# Patient Record
Sex: FEMALE | Race: WHITE | NOT HISPANIC OR LATINO | Employment: UNEMPLOYED | URBAN - METROPOLITAN AREA
[De-identification: names, ages, dates, MRNs, and addresses within clinical notes are randomized per-mention and may not be internally consistent; named-entity substitution may affect disease eponyms.]

---

## 2024-11-22 ENCOUNTER — APPOINTMENT (OUTPATIENT)
Dept: RADIOLOGY | Facility: HOSPITAL | Age: 44
End: 2024-11-22
Payer: MEDICAID

## 2024-11-22 ENCOUNTER — HOSPITAL ENCOUNTER (EMERGENCY)
Facility: HOSPITAL | Age: 44
Discharge: OTHER INSTITUTION | End: 2024-11-23
Attending: STUDENT IN AN ORGANIZED HEALTH CARE EDUCATION/TRAINING PROGRAM
Payer: MEDICAID

## 2024-11-22 ENCOUNTER — APPOINTMENT (OUTPATIENT)
Dept: CARDIOLOGY | Facility: HOSPITAL | Age: 44
End: 2024-11-22
Payer: MEDICAID

## 2024-11-22 DIAGNOSIS — J18.9 PNEUMONIA OF RIGHT LOWER LOBE DUE TO INFECTIOUS ORGANISM: ICD-10-CM

## 2024-11-22 DIAGNOSIS — E83.51 HYPOCALCEMIA: ICD-10-CM

## 2024-11-22 DIAGNOSIS — N17.9 AKI (ACUTE KIDNEY INJURY) (CMS-HCC): Primary | ICD-10-CM

## 2024-11-22 DIAGNOSIS — N19 UREMIA: ICD-10-CM

## 2024-11-22 LAB
ALBUMIN SERPL BCP-MCNC: 2.3 G/DL (ref 3.4–5)
ALBUMIN SERPL BCP-MCNC: 3.3 G/DL (ref 3.4–5)
ALP SERPL-CCNC: 140 U/L (ref 33–110)
ALP SERPL-CCNC: 94 U/L (ref 33–110)
ALT SERPL W P-5'-P-CCNC: 17 U/L (ref 7–45)
ALT SERPL W P-5'-P-CCNC: 24 U/L (ref 7–45)
ANION GAP BLDV CALCULATED.4IONS-SCNC: 25 MMOL/L (ref 10–25)
ANION GAP SERPL CALC-SCNC: 21 MMOL/L (ref 10–20)
ANION GAP SERPL CALC-SCNC: 29 MMOL/L (ref 10–20)
AST SERPL W P-5'-P-CCNC: 42 U/L (ref 9–39)
AST SERPL W P-5'-P-CCNC: 50 U/L (ref 9–39)
BASE EXCESS BLDV CALC-SCNC: -16.3 MMOL/L (ref -2–3)
BASOPHILS # BLD MANUAL: 0 X10*3/UL (ref 0–0.1)
BASOPHILS NFR BLD MANUAL: 0 %
BILIRUB SERPL-MCNC: 1 MG/DL (ref 0–1.2)
BILIRUB SERPL-MCNC: 1.4 MG/DL (ref 0–1.2)
BODY TEMPERATURE: 37 DEGREES CELSIUS
BUN SERPL-MCNC: 86 MG/DL (ref 6–23)
BUN SERPL-MCNC: 91 MG/DL (ref 6–23)
BURR CELLS BLD QL SMEAR: ABNORMAL
CA-I BLD-SCNC: 0.67 MMOL/L (ref 1.1–1.33)
CA-I BLDV-SCNC: 0.87 MMOL/L (ref 1.1–1.33)
CALCIUM SERPL-MCNC: 5.2 MG/DL (ref 8.6–10.3)
CALCIUM SERPL-MCNC: 6.8 MG/DL (ref 8.6–10.3)
CARDIAC TROPONIN I PNL SERPL HS: 6 NG/L (ref 0–13)
CARDIAC TROPONIN I PNL SERPL HS: 7 NG/L (ref 0–13)
CHLORIDE BLDV-SCNC: 88 MMOL/L (ref 98–107)
CHLORIDE SERPL-SCNC: 88 MMOL/L (ref 98–107)
CHLORIDE SERPL-SCNC: 94 MMOL/L (ref 98–107)
CK SERPL-CCNC: 214 U/L (ref 0–215)
CO2 SERPL-SCNC: 12 MMOL/L (ref 21–32)
CO2 SERPL-SCNC: 14 MMOL/L (ref 21–32)
CREAT SERPL-MCNC: 6.01 MG/DL (ref 0.5–1.05)
CREAT SERPL-MCNC: 6.84 MG/DL (ref 0.5–1.05)
DOHLE BOD BLD QL SMEAR: PRESENT
EGFRCR SERPLBLD CKD-EPI 2021: 7 ML/MIN/1.73M*2
EGFRCR SERPLBLD CKD-EPI 2021: 8 ML/MIN/1.73M*2
EOSINOPHIL # BLD MANUAL: 0 X10*3/UL (ref 0–0.7)
EOSINOPHIL NFR BLD MANUAL: 0 %
ERYTHROCYTE [DISTWIDTH] IN BLOOD BY AUTOMATED COUNT: 13.4 % (ref 11.5–14.5)
FLUAV RNA RESP QL NAA+PROBE: NOT DETECTED
FLUBV RNA RESP QL NAA+PROBE: NOT DETECTED
GIANT PLATELETS BLD QL SMEAR: ABNORMAL
GLUCOSE BLD MANUAL STRIP-MCNC: 123 MG/DL (ref 74–99)
GLUCOSE BLD MANUAL STRIP-MCNC: 152 MG/DL (ref 74–99)
GLUCOSE BLD MANUAL STRIP-MCNC: 59 MG/DL (ref 74–99)
GLUCOSE BLDV-MCNC: 64 MG/DL (ref 74–99)
GLUCOSE SERPL-MCNC: 138 MG/DL (ref 74–99)
GLUCOSE SERPL-MCNC: 61 MG/DL (ref 74–99)
HCO3 BLDV-SCNC: 12 MMOL/L (ref 22–26)
HCT VFR BLD AUTO: 40.5 % (ref 36–46)
HCT VFR BLD EST: 43 % (ref 36–46)
HGB BLD-MCNC: 14.5 G/DL (ref 12–16)
HGB BLDV-MCNC: 14.3 G/DL (ref 12–16)
IMM GRANULOCYTES # BLD AUTO: 0.69 X10*3/UL (ref 0–0.7)
IMM GRANULOCYTES NFR BLD AUTO: 5.1 % (ref 0–0.9)
INHALED O2 CONCENTRATION: 21 %
LACTATE BLDV-SCNC: 2.1 MMOL/L (ref 0.4–2)
LACTATE BLDV-SCNC: 2.1 MMOL/L (ref 0.4–2)
LACTATE BLDV-SCNC: 2.7 MMOL/L (ref 0.4–2)
LACTATE SERPL-SCNC: 1.7 MMOL/L (ref 0.4–2)
LIPASE SERPL-CCNC: 19 U/L (ref 9–82)
LYMPHOCYTES # BLD MANUAL: 0 X10*3/UL (ref 1.2–4.8)
LYMPHOCYTES NFR BLD MANUAL: 0 %
MCH RBC QN AUTO: 32.4 PG (ref 26–34)
MCHC RBC AUTO-ENTMCNC: 35.8 G/DL (ref 32–36)
MCV RBC AUTO: 91 FL (ref 80–100)
MONOCYTES # BLD MANUAL: 0.68 X10*3/UL (ref 0.1–1)
MONOCYTES NFR BLD MANUAL: 5 %
NEUTROPHILS # BLD MANUAL: 12.92 X10*3/UL (ref 1.2–7.7)
NEUTS BAND # BLD MANUAL: 0.82 X10*3/UL (ref 0–0.7)
NEUTS BAND NFR BLD MANUAL: 6 %
NEUTS SEG # BLD MANUAL: 12.1 X10*3/UL (ref 1.2–7)
NEUTS SEG NFR BLD MANUAL: 89 %
NEUTS VAC BLD QL SMEAR: PRESENT
NRBC BLD-RTO: 0 /100 WBCS (ref 0–0)
OXYHGB MFR BLDV: 40.9 % (ref 45–75)
PCO2 BLDV: 36 MM HG (ref 41–51)
PH BLDV: 7.13 PH (ref 7.33–7.43)
PLATELET # BLD AUTO: 92 X10*3/UL (ref 150–450)
PLATELET CLUMP BLD QL SMEAR: PRESENT
PO2 BLDV: 33 MM HG (ref 35–45)
POTASSIUM BLDV-SCNC: 3.5 MMOL/L (ref 3.5–5.3)
POTASSIUM SERPL-SCNC: 3.4 MMOL/L (ref 3.5–5.3)
POTASSIUM SERPL-SCNC: 3.5 MMOL/L (ref 3.5–5.3)
PROT SERPL-MCNC: 4.7 G/DL (ref 6.4–8.2)
PROT SERPL-MCNC: 7 G/DL (ref 6.4–8.2)
RBC # BLD AUTO: 4.47 X10*6/UL (ref 4–5.2)
RBC MORPH BLD: ABNORMAL
RSV RNA RESP QL NAA+PROBE: NOT DETECTED
SAO2 % BLDV: 41 % (ref 45–75)
SARS-COV-2 RNA RESP QL NAA+PROBE: NOT DETECTED
SODIUM BLDV-SCNC: 121 MMOL/L (ref 136–145)
SODIUM SERPL-SCNC: 125 MMOL/L (ref 136–145)
SODIUM SERPL-SCNC: 126 MMOL/L (ref 136–145)
TOTAL CELLS COUNTED BLD: 100
TOXIC GRANULES BLD QL SMEAR: PRESENT
WBC # BLD AUTO: 13.6 X10*3/UL (ref 4.4–11.3)

## 2024-11-22 PROCEDURE — 96372 THER/PROPH/DIAG INJ SC/IM: CPT | Mod: 59 | Performed by: NURSE PRACTITIONER

## 2024-11-22 PROCEDURE — 84132 ASSAY OF SERUM POTASSIUM: CPT | Performed by: NURSE PRACTITIONER

## 2024-11-22 PROCEDURE — 36415 COLL VENOUS BLD VENIPUNCTURE: CPT | Performed by: NURSE PRACTITIONER

## 2024-11-22 PROCEDURE — 83605 ASSAY OF LACTIC ACID: CPT | Performed by: NURSE PRACTITIONER

## 2024-11-22 PROCEDURE — 82330 ASSAY OF CALCIUM: CPT

## 2024-11-22 PROCEDURE — 82550 ASSAY OF CK (CPK): CPT | Performed by: NURSE PRACTITIONER

## 2024-11-22 PROCEDURE — 2500000004 HC RX 250 GENERAL PHARMACY W/ HCPCS (ALT 636 FOR OP/ED): Performed by: NURSE PRACTITIONER

## 2024-11-22 PROCEDURE — 71045 X-RAY EXAM CHEST 1 VIEW: CPT

## 2024-11-22 PROCEDURE — 85007 BL SMEAR W/DIFF WBC COUNT: CPT | Performed by: NURSE PRACTITIONER

## 2024-11-22 PROCEDURE — 96375 TX/PRO/DX INJ NEW DRUG ADDON: CPT

## 2024-11-22 PROCEDURE — 96365 THER/PROPH/DIAG IV INF INIT: CPT

## 2024-11-22 PROCEDURE — 96366 THER/PROPH/DIAG IV INF ADDON: CPT

## 2024-11-22 PROCEDURE — 2500000005 HC RX 250 GENERAL PHARMACY W/O HCPCS: Performed by: NURSE PRACTITIONER

## 2024-11-22 PROCEDURE — 99291 CRITICAL CARE FIRST HOUR: CPT | Performed by: NURSE PRACTITIONER

## 2024-11-22 PROCEDURE — 71250 CT THORAX DX C-: CPT | Performed by: STUDENT IN AN ORGANIZED HEALTH CARE EDUCATION/TRAINING PROGRAM

## 2024-11-22 PROCEDURE — 2500000004 HC RX 250 GENERAL PHARMACY W/ HCPCS (ALT 636 FOR OP/ED)

## 2024-11-22 PROCEDURE — 96367 TX/PROPH/DG ADDL SEQ IV INF: CPT

## 2024-11-22 PROCEDURE — 87637 SARSCOV2&INF A&B&RSV AMP PRB: CPT | Performed by: NURSE PRACTITIONER

## 2024-11-22 PROCEDURE — 85027 COMPLETE CBC AUTOMATED: CPT | Performed by: NURSE PRACTITIONER

## 2024-11-22 PROCEDURE — 83690 ASSAY OF LIPASE: CPT | Performed by: NURSE PRACTITIONER

## 2024-11-22 PROCEDURE — 2500000004 HC RX 250 GENERAL PHARMACY W/ HCPCS (ALT 636 FOR OP/ED): Mod: JZ

## 2024-11-22 PROCEDURE — 82947 ASSAY GLUCOSE BLOOD QUANT: CPT

## 2024-11-22 PROCEDURE — 96361 HYDRATE IV INFUSION ADD-ON: CPT

## 2024-11-22 PROCEDURE — 71250 CT THORAX DX C-: CPT

## 2024-11-22 PROCEDURE — 93005 ELECTROCARDIOGRAM TRACING: CPT

## 2024-11-22 PROCEDURE — 84484 ASSAY OF TROPONIN QUANT: CPT | Performed by: NURSE PRACTITIONER

## 2024-11-22 PROCEDURE — 99285 EMERGENCY DEPT VISIT HI MDM: CPT | Mod: 25

## 2024-11-22 PROCEDURE — 71045 X-RAY EXAM CHEST 1 VIEW: CPT | Performed by: RADIOLOGY

## 2024-11-22 PROCEDURE — 74176 CT ABD & PELVIS W/O CONTRAST: CPT | Performed by: STUDENT IN AN ORGANIZED HEALTH CARE EDUCATION/TRAINING PROGRAM

## 2024-11-22 PROCEDURE — 2500000005 HC RX 250 GENERAL PHARMACY W/O HCPCS: Performed by: STUDENT IN AN ORGANIZED HEALTH CARE EDUCATION/TRAINING PROGRAM

## 2024-11-22 PROCEDURE — 51798 US URINE CAPACITY MEASURE: CPT

## 2024-11-22 PROCEDURE — 87040 BLOOD CULTURE FOR BACTERIA: CPT | Mod: PORLAB | Performed by: NURSE PRACTITIONER

## 2024-11-22 RX ORDER — SODIUM BICARBONATE 1 MEQ/ML
50 SYRINGE (ML) INTRAVENOUS ONCE
Status: COMPLETED | OUTPATIENT
Start: 2024-11-22 | End: 2024-11-22

## 2024-11-22 RX ORDER — CALCIUM GLUCONATE 20 MG/ML
1 INJECTION, SOLUTION INTRAVENOUS ONCE
Status: DISCONTINUED | OUTPATIENT
Start: 2024-11-22 | End: 2024-11-22

## 2024-11-22 RX ORDER — CALCIUM GLUCONATE 20 MG/ML
2 INJECTION, SOLUTION INTRAVENOUS ONCE
Status: COMPLETED | OUTPATIENT
Start: 2024-11-22 | End: 2024-11-23

## 2024-11-22 RX ORDER — DICYCLOMINE HYDROCHLORIDE 10 MG/ML
20 INJECTION INTRAMUSCULAR ONCE
Status: COMPLETED | OUTPATIENT
Start: 2024-11-22 | End: 2024-11-22

## 2024-11-22 RX ORDER — DEXTROSE 50 % IN WATER (D50W) INTRAVENOUS SYRINGE
25 ONCE
Status: COMPLETED | OUTPATIENT
Start: 2024-11-22 | End: 2024-11-22

## 2024-11-22 RX ORDER — METOCLOPRAMIDE HYDROCHLORIDE 5 MG/ML
10 INJECTION INTRAMUSCULAR; INTRAVENOUS ONCE
Status: COMPLETED | OUTPATIENT
Start: 2024-11-22 | End: 2024-11-22

## 2024-11-22 RX ORDER — NOREPINEPHRINE BITARTRATE/D5W 8 MG/250ML
0-.2 PLASTIC BAG, INJECTION (ML) INTRAVENOUS CONTINUOUS
Status: DISCONTINUED | OUTPATIENT
Start: 2024-11-22 | End: 2024-11-23 | Stop reason: HOSPADM

## 2024-11-22 RX ORDER — CEFTRIAXONE 2 G/50ML
2 INJECTION, SOLUTION INTRAVENOUS ONCE
Status: COMPLETED | OUTPATIENT
Start: 2024-11-22 | End: 2024-11-22

## 2024-11-22 RX ORDER — ONDANSETRON HYDROCHLORIDE 2 MG/ML
INJECTION, SOLUTION INTRAVENOUS
Status: COMPLETED
Start: 2024-11-22 | End: 2024-11-22

## 2024-11-22 RX ADMIN — METOCLOPRAMIDE 10 MG: 5 INJECTION, SOLUTION INTRAMUSCULAR; INTRAVENOUS at 18:28

## 2024-11-22 RX ADMIN — SODIUM CHLORIDE 1000 ML: 9 INJECTION, SOLUTION INTRAVENOUS at 21:40

## 2024-11-22 RX ADMIN — DICYCLOMINE HYDROCHLORIDE 20 MG: 10 INJECTION, SOLUTION INTRAMUSCULAR at 18:28

## 2024-11-22 RX ADMIN — CALCIUM GLUCONATE 2 G: 20 INJECTION, SOLUTION INTRAVENOUS at 23:35

## 2024-11-22 RX ADMIN — DEXTROSE MONOHYDRATE 25 G: 25 INJECTION, SOLUTION INTRAVENOUS at 15:16

## 2024-11-22 RX ADMIN — SODIUM CHLORIDE 1770 ML: 9 INJECTION, SOLUTION INTRAVENOUS at 15:13

## 2024-11-22 RX ADMIN — SODIUM BICARBONATE 100 ML/HR: 84 INJECTION, SOLUTION INTRAVENOUS at 17:08

## 2024-11-22 RX ADMIN — ONDANSETRON: 2 INJECTION INTRAMUSCULAR; INTRAVENOUS at 17:59

## 2024-11-22 RX ADMIN — AZITHROMYCIN MONOHYDRATE 500 MG: 500 INJECTION, POWDER, LYOPHILIZED, FOR SOLUTION INTRAVENOUS at 17:39

## 2024-11-22 RX ADMIN — SODIUM BICARBONATE 50 MEQ: 84 INJECTION INTRAVENOUS at 15:38

## 2024-11-22 RX ADMIN — NOREPINEPHRINE BITARTRATE 0.01 MCG/KG/MIN: 8 INJECTION, SOLUTION INTRAVENOUS at 19:18

## 2024-11-22 RX ADMIN — CEFTRIAXONE SODIUM 2 G: 2 INJECTION, SOLUTION INTRAVENOUS at 17:07

## 2024-11-22 ASSESSMENT — PAIN SCALES - GENERAL: PAINLEVEL_OUTOF10: 6

## 2024-11-22 ASSESSMENT — LIFESTYLE VARIABLES
EVER HAD A DRINK FIRST THING IN THE MORNING TO STEADY YOUR NERVES TO GET RID OF A HANGOVER: NO
HAVE PEOPLE ANNOYED YOU BY CRITICIZING YOUR DRINKING: NO
EVER FELT BAD OR GUILTY ABOUT YOUR DRINKING: NO
TOTAL SCORE: 0
HAVE YOU EVER FELT YOU SHOULD CUT DOWN ON YOUR DRINKING: NO

## 2024-11-22 ASSESSMENT — PAIN - FUNCTIONAL ASSESSMENT
PAIN_FUNCTIONAL_ASSESSMENT: 0-10
PAIN_FUNCTIONAL_ASSESSMENT: 0-10

## 2024-11-22 ASSESSMENT — COLUMBIA-SUICIDE SEVERITY RATING SCALE - C-SSRS
2. HAVE YOU ACTUALLY HAD ANY THOUGHTS OF KILLING YOURSELF?: NO
6. HAVE YOU EVER DONE ANYTHING, STARTED TO DO ANYTHING, OR PREPARED TO DO ANYTHING TO END YOUR LIFE?: NO
1. IN THE PAST MONTH, HAVE YOU WISHED YOU WERE DEAD OR WISHED YOU COULD GO TO SLEEP AND NOT WAKE UP?: NO

## 2024-11-22 ASSESSMENT — PAIN DESCRIPTION - DESCRIPTORS: DESCRIPTORS: ACHING

## 2024-11-22 NOTE — ED PROVIDER NOTES
HPI   Chief Complaint   Patient presents with    Generalized Body Aches     Pt has been weak for a week. Pt describes tiredness and weakness.        44-year-old female with past history of hypertension, former IV drug abuse sober for 8 years presents the emergency department today for abdominal pain, nausea, vomiting, generalized weakness, myalgias and fatigue for the past week.  Patient states that she has had an intermittent cough with lightheadedness upon standing as well.  There is intermittent shortness of breath.  History of multiple GI surgeries in the past with appendectomy and total hysterectomy as well.  Is having some midsternal chest pain.  The abdominal pain does not radiate to her back.  Denies any diarrhea.  She has had decreased p.o. intake due to lack of appetite.  No neck pain or stiffness.  Denies any sore throat.                          No data recorded                Patient History   Past Medical History:   Diagnosis Date    Anxiety disorder, unspecified     Anxiety and depression     Past Surgical History:   Procedure Laterality Date    OTHER SURGICAL HISTORY  12/16/2020    Laparoscopy    OTHER SURGICAL HISTORY  12/16/2020    Esophagogastroduodenoscopy    OTHER SURGICAL HISTORY  12/16/2020    Cholecystectomy    OTHER SURGICAL HISTORY  12/16/2020    Appendectomy    OTHER SURGICAL HISTORY  12/16/2020    Tonsillectomy     No family history on file.  Social History     Tobacco Use    Smoking status: Not on file    Smokeless tobacco: Not on file   Substance Use Topics    Alcohol use: Not on file    Drug use: Not Currently     Comment: used heroine, clean for 8 years       Physical Exam   ED Triage Vitals   Temperature Heart Rate Respirations BP   11/22/24 1435 11/22/24 1435 11/22/24 1435 11/22/24 1435   37 °C (98.6 °F) 87 20 81/51      Pulse Ox Temp Source Heart Rate Source Patient Position   11/22/24 1441 11/22/24 1435 -- --   100 % Temporal        BP Location FiO2 (%)     -- --              Physical Exam  Vitals and nursing note reviewed.   Constitutional:       General: She is not in acute distress.     Appearance: Normal appearance. She is ill-appearing and toxic-appearing.   HENT:      Right Ear: Tympanic membrane normal.      Left Ear: Tympanic membrane normal.      Mouth/Throat:      Mouth: Mucous membranes are moist.      Pharynx: Oropharynx is clear.   Eyes:      Extraocular Movements: Extraocular movements intact.      Pupils: Pupils are equal, round, and reactive to light.   Cardiovascular:      Rate and Rhythm: Normal rate and regular rhythm.      Pulses: Normal pulses.      Heart sounds: Normal heart sounds.   Pulmonary:      Effort: Pulmonary effort is normal.      Breath sounds: Normal breath sounds.   Abdominal:      General: Abdomen is flat. Bowel sounds are normal.      Palpations: Abdomen is soft.      Tenderness: There is abdominal tenderness. There is no right CVA tenderness, left CVA tenderness, guarding or rebound.   Musculoskeletal:         General: Normal range of motion.      Cervical back: Normal range of motion and neck supple.      Right lower leg: No edema.      Left lower leg: No edema.      Comments: Pulses equal bilateral lower extremities.   Skin:     General: Skin is warm and dry.      Capillary Refill: Capillary refill takes less than 2 seconds.   Neurological:      General: No focal deficit present.      Mental Status: She is alert and oriented to person, place, and time.   Psychiatric:         Mood and Affect: Mood normal.         Behavior: Behavior normal.         Judgment: Judgment normal.         Labs Reviewed   BLOOD CULTURE - Abnormal       Result Value    Blood Culture Escherichia coli (*)     BLOOD CULTURE BOTTLE  Positive Aerobic and Anaerobic Bottle      Gram Stain Gram negative bacilli (*)     Gram Stain Gram negative bacilli (*)    BLOOD CULTURE - Abnormal    Blood Culture Escherichia coli (*)     BLOOD CULTURE BOTTLE  Positive Aerobic and Anaerobic  Bottle      Gram Stain Gram negative bacilli (*)     Gram Stain Gram negative bacilli (*)    CBC WITH AUTO DIFFERENTIAL - Abnormal    WBC 13.6 (*)     nRBC 0.0      RBC 4.47      Hemoglobin 14.5      Hematocrit 40.5      MCV 91      MCH 32.4      MCHC 35.8      RDW 13.4      Platelets 92 (*)     Immature Granulocytes %, Automated 5.1 (*)     Immature Granulocytes Absolute, Automated 0.69     COMPREHENSIVE METABOLIC PANEL - Abnormal    Glucose 61 (*)     Sodium 125 (*)     Potassium 3.5      Chloride 88 (*)     Bicarbonate 12 (*)     Anion Gap 29 (*)     Urea Nitrogen 91 (*)     Creatinine 6.84 (*)     eGFR 7 (*)     Calcium 6.8 (*)     Albumin 3.3 (*)     Alkaline Phosphatase 140 (*)     Total Protein 7.0      AST 50 (*)     Bilirubin, Total 1.4 (*)     ALT 24     BLOOD GAS VENOUS FULL PANEL - Abnormal    POCT pH, Venous 7.13 (*)     POCT pCO2, Venous 36 (*)     POCT pO2, Venous 33 (*)     POCT SO2, Venous 41 (*)     POCT Oxy Hemoglobin, Venous 40.9 (*)     POCT Hematocrit Calculated, Venous 43.0      POCT Sodium, Venous 121 (*)     POCT Potassium, Venous 3.5      POCT Chloride, Venous 88 (*)     POCT Ionized Calicum, Venous 0.87 (*)     POCT Glucose, Venous 64 (*)     POCT Lactate, Venous 2.1 (*)     POCT Base Excess, Venous -16.3 (*)     POCT HCO3 Calculated, Venous 12.0 (*)     POCT Hemoglobin, Venous 14.3      POCT Anion Gap, Venous 25.0      Patient Temperature 37.0      FiO2 21     BLOOD GAS LACTIC ACID, VENOUS - Abnormal    POCT Lactate, Venous 2.7 (*)    BLOOD GAS LACTIC ACID, VENOUS - Abnormal    POCT Lactate, Venous 2.1 (*)    COMPREHENSIVE METABOLIC PANEL - Abnormal    Glucose 138 (*)     Sodium 126 (*)     Potassium 3.4 (*)     Chloride 94 (*)     Bicarbonate 14 (*)     Anion Gap 21 (*)     Urea Nitrogen 86 (*)     Creatinine 6.01 (*)     eGFR 8 (*)     Calcium 5.2 (*)     Albumin 2.3 (*)     Alkaline Phosphatase 94      Total Protein 4.7 (*)     AST 42 (*)     Bilirubin, Total 1.0      ALT 17      CALCIUM, IONIZED - Abnormal    POCT Calcium, Ionized 0.67 (*)    POCT GLUCOSE - Abnormal    POCT Glucose 59 (*)    POCT GLUCOSE - Abnormal    POCT Glucose 123 (*)    POCT GLUCOSE - Abnormal    POCT Glucose 152 (*)    MANUAL DIFFERENTIAL - Abnormal    Neutrophils %, Manual 89.0      Bands %, Manual 6.0      Lymphocytes %, Manual 0.0      Monocytes %, Manual 5.0      Eosinophils %, Manual 0.0      Basophils %, Manual 0.0      Seg Neutrophils Absolute, Manual 12.10 (*)     Bands Absolute, Manual 0.82 (*)     Lymphocytes Absolute, Manual 0.00 (*)     Monocytes Absolute, Manual 0.68      Eosinophils Absolute, Manual 0.00      Basophils Absolute, Manual 0.00      Total Cells Counted 100      Neutrophils Absolute, Manual 12.92 (*)     RBC Morphology See Below      [place] Cells Few      Dohle Bodies Present      Toxic Granulation Present      Vacuolated Neutrophils Present      Giant Platelets Few      Clumped Platelets Present     LACTATE - Normal    Lactate 1.7      Narrative:     Venipuncture immediately after or during the administration of Metamizole may lead to falsely low results. Testing should be performed immediately prior to Metamizole dosing.   LIPASE - Normal    Lipase 19      Narrative:     Venipuncture immediately after or during the administration of Metamizole may lead to falsely low results. Testing should be performed immediately prior to Metamizole dosing.   SARS-COV-2 PCR - Normal    Coronavirus 2019, PCR Not Detected      Narrative:     This assay has received FDA Emergency Use Authorization (EUA) and is only authorized for the duration of time that circumstances exist to justify the authorization of the emergency use of in vitro diagnostic tests for the detection of SARS-CoV-2 virus and/or diagnosis of COVID-19 infection under section 564(b)(1) of the Act, 21 U.S.C. 360bbb-3(b)(1). This assay is an in vitro diagnostic nucleic acid amplification test for the qualitative detection of SARS-CoV-2 from  nasopharyngeal specimens and has been validated for use at [facility]. Negative results do not preclude COVID-19 infections and should not be used as the sole basis for diagnosis, treatment, or other management decisions.     RSV PCR - Normal    RSV PCR Not Detected      Narrative:     This assay is an FDA-cleared, in vitro diagnostic nucleic acid amplification test for the detection of RSV from nasopharyngeal specimens, and has been validated for use at [facility]. Negative results do not preclude RSV infections, and should not be used as the sole basis for diagnosis, treatment, or other management decisions. If Influenza A/B and RSV PCR results are negative, testing for Parainfluenza virus, Adenovirus and Metapneumovirus is routinely performed for pediatric oncology and intensive care inpatients at [facility], and is available on other patients by placing an add-on request.       INFLUENZA A AND B PCR - Normal    Flu A Result Not Detected      Flu B Result Not Detected      Narrative:     This assay is an in vitro diagnostic multiplex nucleic acid amplification test for the detection and discrimination of Influenza A & B from nasopharyngeal specimens, and has been validated for use at [facility]. Negative results do not preclude Influenza A/B infections, and should not be used as the sole basis for diagnosis, treatment, or other management decisions. If Influenza A/B and RSV PCR results are negative, testing for Parainfluenza virus, Adenovirus and Metapneumovirus is routinely performed for [facility] pediatric oncology and intensive care inpatients, and is available on other patients by placing an add-on request.   SERIAL TROPONIN-INITIAL - Normal    Troponin I, High Sensitivity 7      Narrative:     Less than 99th percentile of normal range cutoff-  Female and children under 18 years old <14 ng/L; Male <21 ng/L: Negative  Repeat testing should be performed  if clinically indicated.     Female and children under 18 years old 14-50 ng/L; Male 21-50 ng/L:  Consistent with possible cardiac damage and possible increased clinical   risk. Serial measurements may help to assess extent of myocardial damage.     >50 ng/L: Consistent with cardiac damage, increased clinical risk and  myocardial infarction. Serial measurements may help assess extent of   myocardial damage.      NOTE: Children less than 1 year old may have higher baseline troponin   levels and results should be interpreted in conjunction with the overall   clinical context.     NOTE: Troponin I testing is performed using a different   testing methodology at [facility] than at other   [facility]. Direct result comparisons should only   be made within the same method.   SERIAL TROPONIN, 1 HOUR - Normal    Troponin I, High Sensitivity 6      Narrative:     Less than 99th percentile of normal range cutoff-  Female and children under 18 years old <14 ng/L; Male <21 ng/L: Negative  Repeat testing should be performed if clinically indicated.     Female and children under 18 years old 14-50 ng/L; Male 21-50 ng/L:  Consistent with possible cardiac damage and possible increased clinical   risk. Serial measurements may help to assess extent of myocardial damage.     >50 ng/L: Consistent with cardiac damage, increased clinical risk and  myocardial infarction. Serial measurements may help assess extent of   myocardial damage.      NOTE: Children less than 1 year old may have higher baseline troponin   levels and results should be interpreted in conjunction with the overall   clinical context.     NOTE: Troponin I testing is performed using a different   testing methodology at [facility] than at other   [facility]. Direct result comparisons should only   be made within the same method.   CREATINE KINASE - Normal    Creatine Kinase 214     TROPONIN SERIES- (INITIAL, 1 HR)    Narrative:      The following orders were created for panel order Troponin Series, (0, 1 HR).  Procedure                               Abnormality         Status                     ---------                               -----------         ------                     Troponin I, High Sensiti...[[id number]]  Normal              Final result               Troponin, High Sensitivi...[[id number]]  Normal              Final result                 Please view results for these tests on the individual orders.     Pain Management Panel           No data to display              CT chest abdomen pelvis wo IV contrast   Final Result   CT CHEST:   No acute abnormality in the chest.        Emphysema.             CT ABDOMEN/PELVIS:   No acute abnormality in the abdomen or pelvis.        Hepatic steatosis.        MACRO:   None.        Signed by: Harjeet Wei 11/22/2024 7:40 PM   Dictation workstation:   [id number]      XR chest 1 view   Final Result   1.  Mild infiltrate developing in the right middle or lower lobe not   apparent previously.                  MACRO:   None        Signed by: Joseph Schoenberger 11/22/2024 4:13 PM   Dictation workstation:   [id number]          ED Course & [facility]   ED Course as of 11/27/24 0107   Fri Nov 22, 2024   1531 Patient is found to be metabolic acidosis.  Is compensated at this time.  Calcium low replace with 1 of calcium gluconate, bicarb of 12 replaced with an amp of bicarb.  CK added on at this time. [RB]   1551 Patient found to be in new onset kidney failure BUN 91 creat of 6.84.  Reevaluated the patient at this time.  She states she is feeling much better.  Blood pressure is starting to improve.  CT chest abdomen pelvis changed to a without contrast at this time. [RB]   1637 EKG sinus rhythm rate of 81  QRS 91 QTc 483 no ST elevation or axis deviation [RB]   1643 Nephrology paged at this time regarding patient's status. [RB]   3932 Plan to page [facility] due to patient's insurance we are waiting  for CT imaging prior to conferencing however patient states she is feeling much better at this time [RB]   1822 Patient is having a lot of abdominal cramping at this time and continued nausea.  Reglan and Bentyl ordered and pending.  Still awaiting CT imaging. [RB]   2103 I have repaged [facility] at this time currently waiting to hear back from them. [RB]   2124 Spoke with her ICU nurse practitioner at this time to make her aware of the patient in case we need consult and have her down into the ICU until she gets a bed at [facility]. [RB]   2243 POCT Lactate, Venous(!): 2.1 [RAÚL]   2253 Calcium(!!): 5.2  Ionized calcium ordered [RAÚL]   2329 POCT Calcium, Ionized(!!): 0.67  Calcium gluconate 2 g IV ordered [RAÚL]      ED Course User Index  [RAÚL] Rene Talbot MD  [RB] So Ibarra, APRN-CNP         Diagnoses as of 11/27/24 0107   ADITYA (acute kidney injury) (CMS-[facility])   Pneumonia of right lower lobe due to infectious organism   Hypocalcemia   Uremia       Medical Decision Making  On initial evaluation I did see the patient as a provider in triage however she was found to be markedly hypotensive and very ill-appearing.  Immediately took the patient back to her room for further evaluation.  Glucose low.  Amp of D50 ordered as well as septic fluids 1770 normal saline.  Patient does appear to be very fluid down on exam.  Sepsis protocol was initiated. See ED course however sepsis identified at 1640 due to chest x-ray showing a mild infiltrate in the right middle and lower lobe possibly underlying pneumonia.  Blood cultures are all pending.  She was given azithromycin and Rocephin in the emergency department.  She also required Reglan and Bentyl for abdominal cramping and nausea.  After speaking with nephrology she was started on a D5 bicarb drip.  Patient was ultimately started on Levophed at 1 point due to decreased maps.  An additional 1 L was ordered and she was ultimately weaned off of the peripheral Levophed.  She is now  making urine.  She refused a catheter.  Trope of 7 with repeat of 6 COVID is negative flu and RSV are negative lipase within normal limits CBC shows leukocytosis at 13.6 CK of 214.  A repeat lactic and CMP are currently pending.  CT chest abdomen and pelvis shows no acute abnormality.  Patient and family at bedside are aware of our plan to transfer her to [facility].  Did speak with ICU at our facility in case they do not have any availability for beds and we will ultimately place her at the ICU while she is waiting for a bed at [facility] likely.  Still waiting on a [facility] callback.  Dr. Talbot will field the call.        Procedure  Critical Care    Performed by: THEODORA Romo  Authorized by: Caitlin Ramsey MD    Critical care provider statement:     Critical care time (minutes):  60    Critical care time was exclusive of:  Separately billable procedures and treating other patients    Critical care was necessary to treat or prevent imminent or life-threatening deterioration of the following conditions:  Renal failure, sepsis, dehydration and metabolic crisis    Critical care was time spent personally by me on the following activities:  Blood draw for specimens, development of treatment plan with patient or surrogate, discussions with consultants, evaluation of patient's response to treatment, examination of patient, ordering and performing treatments and interventions, ordering and review of laboratory studies, ordering and review of radiographic studies, pulse oximetry and re-evaluation of patient's condition    Care discussed with: admitting provider          Differential Diagnoses include pneumonia, flu, COVID, intra-abdominal process, ADITYA, electrolyte abnormality.  This is not an exhaustive list of all the diagnosis and therapeutics are considered during the patient's evaluation for an emergency medical condition.     THEODORA Romo  11/22/24 0409       So Ibarra  EPTE-CNP  11/27/24 0107

## 2024-11-23 VITALS
WEIGHT: 130 LBS | RESPIRATION RATE: 16 BRPM | HEART RATE: 92 BPM | HEIGHT: 64 IN | SYSTOLIC BLOOD PRESSURE: 102 MMHG | TEMPERATURE: 98.6 F | OXYGEN SATURATION: 98 % | BODY MASS INDEX: 22.2 KG/M2 | DIASTOLIC BLOOD PRESSURE: 61 MMHG

## 2024-11-23 NOTE — PROGRESS NOTES
Emergency Department Transition of Care Note       Signout   I received Meghana Schulz in signout from Dr. Ramsey.  Please see the ED Provider Note for all HPI, PE and MDM up to the time of signout at 2100.  This is in addition to the primary record.    In brief Meghana Schulz is an 44 y.o. female presenting for generalized bodyaches.  Patient with a history of former IV drug abuse has been sober for about 8 years.  The past week has been having epigastric pain, nausea, vomiting, generalized weakness, myalgias, and fatigue.  She has not been eating or drinking in the past week.  Workup remarkable for VBG with acidosis with a pH of 7.13.  CMP with hyperglycemia of 61, anion gap metabolic acidosis, uremia with a BUN 91, ADITYA with creatinine 6.84, AST 50.  Was given D50 for hypoglycemia.  CBC with leukocytosis 13.6.  Blood cultures were drawn.  Was given a 30 cc/kg bolus of IV fluids as she was hypotensive at 81/51.  Despite getting a bolus of IV fluid she remained hypotensive and was started on Levophed. Chest x-ray with developing right middle lower lobe infiltrate.  Was given dose of azithromycin and Rocephin.  CT abdomen pelvis unremarkable.  Patient was discussed with nephrology and was started on a bicarb drip for her acidosis.  Was able to be weaned off Levophed.  Patient's insurance is out of network would like to be transferred to [facility] for further management and monitoring.    At the time of signout we were awaiting:  Discussion with [facility] regarding transfer.    ED Course & Medical Decision Making   Medical Decision Making:  Under my care, patient was accepted for admission to MICU at [facility] under Dr. Chavez.  On reevaluation, patient states that she feels improved after IV fluids.  Patient states she has not been peeing.  She remained hemodynamically stable once weaned off the Levophed.  Patient transferred to [facility] in stable condition.    ED Course:  ED Course as of 11/23/24  0838   Fri Nov 22, 2024   1531 Patient is found to be metabolic acidosis.  Is compensated at this time.  Calcium low replace with 1 of calcium gluconate, bicarb of 12 replaced with an amp of bicarb.  CK added on at this time. [RB]   1551 Patient found to be in new onset kidney failure BUN 91 creat of 6.84.  Reevaluated the patient at this time.  She states she is feeling much better.  Blood pressure is starting to improve.  CT chest abdomen pelvis changed to a without contrast at this time. [RB]   1637 EKG sinus rhythm rate of 81  QRS 91 QTc 483 no ST elevation or axis deviation [RB]   1643 Nephrology paged at this time regarding patient's status. [RB]   1718 Plan to page [facility] due to patient's insurance we are waiting for CT imaging prior to conferencing however patient states she is feeling much better at this time [RB]   1822 Patient is having a lot of abdominal cramping at this time and continued nausea.  Reglan and Bentyl ordered and pending.  Still awaiting CT imaging. [RB]   2103 I have repaged [facility] [place] at this time currently waiting to hear back from them. [RB]   2124 Spoke with her ICU nurse practitioner at this time to make her aware of the patient in case we need consult and have her down into the ICU until she gets a bed at [facility] [place]. [RB]   2243 POCT Lactate, Venous(!): 2.1 [RAÚL]   2253 Calcium(!!): 5.2  Ionized calcium ordered [RAÚL]   2329 POCT Calcium, Ionized(!!): 0.67  Calcium gluconate 2 g IV ordered [RAÚL]      ED Course User Index  [RAÚL] Rene Talbot MD  [RB] So Ibarra, APRN-CNP         Diagnoses as of 11/23/24 0838   ADITYA (acute kidney injury) (CMS-HCC)   Pneumonia of right lower lobe due to infectious organism   Hypocalcemia   Uremia       Disposition   As a result of their workup, the patient will require transfer to another facility.  The patient and/or her guardian/representative is agreeable to transfer at this time.   We will continue to monitor and manage the patient in the  Emergency Department until transport for transfer can be arranged.    Procedures   Procedures        Rene Talbot MD  Emergency Medicine

## 2024-11-24 LAB
BACTERIA BLD AEROBE CULT: ABNORMAL
BACTERIA BLD AEROBE CULT: ABNORMAL
BACTERIA BLD CULT: ABNORMAL
BACTERIA BLD CULT: ABNORMAL
GRAM STN SPEC: ABNORMAL

## 2024-11-25 ENCOUNTER — TELEPHONE (OUTPATIENT)
Dept: PHARMACY | Facility: HOSPITAL | Age: 44
End: 2024-11-25
Payer: MEDICAID

## 2024-11-25 NOTE — PROGRESS NOTES
EDPD Note: Lab/Chart Reviewed    Reviewed Mr./Mrs./Ms. Meghana Schulz 's chart regarding a positive blood culture/result that was taken during their recent emergency room visit. The patient was admitted to [facility]  .Therefore, I have faxed this information to [facility] at fax number [phone] .    Susceptibility data from last 90 days.  Collected Specimen Info Organism Ampicillin Cefazolin Ciprofloxacin Gentamicin Levofloxacin Piperacillin/Tazobactam Trimethoprim/Sulfamethoxazole   11/22/24 Blood culture from Peripheral Venipuncture Escherichia coli  S  S  S  S  S  S  S   11/22/24 Blood culture from Peripheral Venipuncture Escherichia coli              No further follow up needed from EDPD Team.     Jada Villagran, Lucie

## 2024-11-28 LAB
ATRIAL RATE: 81 BPM
P AXIS: 68 DEGREES
PR INTERVAL: 148 MS
Q ONSET: 252 MS
QRS COUNT: 13 BEATS
QRS DURATION: 91 MS
QT INTERVAL: 416 MS
QTC CALCULATION(BAZETT): 483 MS
QTC FREDERICIA: 459 MS
R AXIS: 34 DEGREES
T AXIS: 46 DEGREES
T OFFSET: 460 MS
VENTRICULAR RATE: 81 BPM